# Patient Record
Sex: FEMALE | Race: WHITE | Employment: OTHER | URBAN - METROPOLITAN AREA
[De-identification: names, ages, dates, MRNs, and addresses within clinical notes are randomized per-mention and may not be internally consistent; named-entity substitution may affect disease eponyms.]

---

## 2017-03-27 ENCOUNTER — HOSPITAL ENCOUNTER (OUTPATIENT)
Dept: GENERAL RADIOLOGY | Age: 72
Discharge: HOME | End: 2017-03-27
Attending: NURSE PRACTITIONER
Payer: MEDICARE

## 2017-03-27 DIAGNOSIS — M79.671 PAIN OF RIGHT HEEL: ICD-10-CM

## 2017-03-27 PROCEDURE — 73630 X-RAY EXAM OF FOOT: CPT

## 2017-06-28 PROBLEM — I10 ESSENTIAL HYPERTENSION WITH GOAL BLOOD PRESSURE LESS THAN 130/85: Status: ACTIVE | Noted: 2017-06-28

## 2017-06-28 PROBLEM — E78.2 MIXED HYPERLIPIDEMIA: Status: ACTIVE | Noted: 2017-06-28

## 2017-06-28 PROBLEM — Z95.1 S/P CABG X 4: Status: ACTIVE | Noted: 2017-06-28

## 2019-11-21 ENCOUNTER — HOSPITAL ENCOUNTER (OUTPATIENT)
Dept: PHYSICAL THERAPY | Age: 74
Discharge: HOME | End: 2019-11-21
Attending: FAMILY MEDICINE
Payer: MEDICARE

## 2019-11-21 DIAGNOSIS — S46.011A ROTATOR CUFF STRAIN, RIGHT, INITIAL ENCOUNTER: ICD-10-CM

## 2019-11-21 PROCEDURE — 97161 PT EVAL LOW COMPLEX 20 MIN: CPT | Performed by: PHYSICAL THERAPIST

## 2019-11-21 PROCEDURE — 97110 THERAPEUTIC EXERCISES: CPT | Performed by: PHYSICAL THERAPIST

## 2019-11-21 NOTE — THERAPY EVALUATION
Amandabaljinder Reasons  : 1945  Primary: Alayna Albert Medicare Choice *  Secondary:  [address]  at [facility]  [address], [address], [place]  [phone]   [phone]       OUTPATIENT PHYSICAL THERAPY:Initial Assessment 2019   ICD-10: Treatment Diagnosis: (M25.511) R shoulder pain; (M62.81) muscle weakness  Precautions/Allergies:   Patient has no known allergies. TREATMENT PLAN:  Effective Dates: 2019 TO 2020 (90 days). Frequency/Duration: 1 time a week for 90 Day(s)     MEDICAL/REFERRING DIAGNOSIS:  Rotator cuff strain, right, initial encounter [S46.011A]   DATE OF ONSET: May 2019  REFERRING PHYSICIAN: Precious Nguyen MD MD Orders: Evaluate and Treat for R RC strain  Return MD Appointment: May 2020     INITIAL ASSESSMENT:  Ms. Nikki Del Castillo presents with s/s consistent with above diagnosis. She presents with decreased R shoulder AROM and strength and an increase in R anterior shoulder pain. Pt scored a 30% disability rating on the quick dash. Pt will benefit from skilled PT to address the deficits listed below. PROBLEM LIST (Impacting functional limitations):  1. Decreased Strength  2. Decreased ADL/Functional Activities  3. Increased Pain  4. Decreased Flexibility/Joint Mobility  5. Decreased Knowledge of Precautions  6. Decreased [place] with Home Exercise Program INTERVENTIONS PLANNED: (Treatment may consist of any combination of the following)  1. Cold  2. Electrical Stimulation  3. Heat  4. Home Exercise Program (HEP)  5. Manual Therapy  6. Neuromuscular Re-education/Strengthening  7. Range of Motion (ROM)  8. Therapeutic Exercise/Strengthening  9. Transcutaneous Electrical Nerve Stimulation (TENS)  10. Ultrasound (US)  11. Kinesiotaping   TREATMENT PLAN:  GOALS: (Goals have been discussed and agreed upon with patient.)  Short-Term Functional Goals: Time Frame: 4-6 weeks (2020)  1.    Pt will be compliant and independent with HEP.  2.   Pt will improve Quick Dash score to <25% disability to increase pt's overall functional mobility. 3.   Pt to subjectively report a decrease in pain to 3/10 @ worst to increase pt's ease with ADLs. 4.   Pt will improve standing AROM of R shoulder to >Flex/Scapt: 150deg; ABD:140deg; ER in N:55deg; ER in ABD:75deg; and IR:T9 to improve pt's ability to reach behind back, overhead and perform ADLs such as dressing without modifications. 5.   Pt will be able to sleep with decreased frequency (1x/night) of waking due to R shoulder pain. 6.   Pt will increase MMT to 3+/5 throughout R shoulder and 4+/5 for elbow in order to perform house hold and work related tasks with greater ease. Discharge Goals: Time Frame: 8-12 weeks (2-)  1. Pt will improve Quick Dash score to <20 % disability to increase pt's overall functional mobility. 2.   Pt will subjectively report a decrease in pain to 2/10 @ worst to allow pt greater ease with reaching overhead and dressing. 3.   Pt will no longer wake due to R shoulder pain. 4.   Pt will demonstrate AROM of R  shoulder that is within 10deg of the L and does not limit function. 5.   Pt will increase MMT to 4/5 throughout R shoulder to allow pt to return to PLOF with min to no c/o R shoulder pain. 6.   Pt will be discharged from PT to HEP. OUTCOME MEASURE:   Tool Used: Disabilities of the Arm, Shoulder and Hand (DASH) Questionnaire - Quick Version  Score:  Initial: 24/55=30% disability  Most Recent: X/55 (Date: -- )   Interpretation of Score: The DASH is designed to measure the activities of daily living in person's with upper extremity dysfunction or pain. Each section is scored on a 1-5 scale, 5 representing the greatest disability. The scores of each section are added together for a total score of 55.         MEDICAL NECESSITY:   · Patient is expected to demonstrate progress in strength and range of motion to decrease R shoulder pain and increase pt's ease with ADLs and caregiver repsponsibilities. .  Total Duration: 15 minutes initial assessment; see daily treatment note  PT Patient Time In/Time Out  Time In: 0935  Time Out: 1015    Rehabilitation Potential For Stated Goals: Good  Regarding Chuy Lovell therapy, I certify that the treatment plan above will be carried out by a therapist or under their direction. Thank you for this referral,  Lorne Calvert, PT     Referring Physician Signature: Denita Balderrama MD _______________________________ Date _____________     PAIN/SUBJECTIVE:   Initial: Pain Intensity 1: 5  Pain Location 1: Shoulder  Pain Orientation 1: Right  Post Session:  5/10   HISTORY:   History of Injury/Illness (Reason for Referral):  Pt reports she injured her R shoulder while caring for her disabled  in May 2019. She denies a fall or MVA. She reports it's wear and tear. She takes Aleve prn to manage pain and heat to sleep when needed. Past Medical History/Comorbidities:   Ms. Kalpesh Flores  has a past medical history of CAD (coronary artery disease), History of tetanus, diphtheria, and acellular pertussis booster vaccination (Tdap) (2012), Hypertension, and S/P colonoscopy (2013). Ms. Kalpesh Flores  has a past surgical history that includes pr cardiac surg procedure unlist; hx gyn; and hx colonoscopy (2013). Social History/Living Environment:     Pt lives in a 1 story home with her . She is his caregiver. Prior Level of Function/Work/Activity:  Pt is retired, but is very active at home and outside the house. Dominant Side:         RIGHT   Ambulatory/Rehab Services H2 Model Falls Risk Assessment (11/21/19)   Risk Factors:       No Risk Factors Identified Ability to Rise from Chair:       (0)  Ability to rise in a single movement   Falls Prevention Plan:       No modifications necessary   Total: (5 or greater = High Risk): 0   ©2010 [facility] of [employer]. [facility] States Patent #[id number].  Federal Law prohibits the replication, distribution or use without written permission from [facility] [employer] [facility]   Current Medications:       Current Outpatient Medications:     tamoxifen (NOLVADEX) 20 mg tablet, Take 1 Tab by mouth daily. , Disp: 90 Tab, Rfl: 3    atorvastatin (LIPITOR) 80 mg tablet, TAKE 1 TABLET ONE TIME DAILY, Disp: 90 Tab, Rfl: 3    lisinopril (PRINIVIL, ZESTRIL) 20 mg tablet, Take 1 Tab by mouth daily. , Disp: 90 Tab, Rfl: 3    traZODone (DESYREL) 50 mg tablet, TAKE 1 TABLET AT BEDTIME, Disp: 90 Tab, Rfl: 3    venlafaxine-SR (EFFEXOR-XR) 37.5 mg capsule, Take once per day, Disp: 90 Cap, Rfl: 3    MULTIVITAMIN PO, Take  by mouth daily. , Disp: , Rfl:    Date Last Reviewed:  11/21/2019     Number of Personal Factors/Comorbidities that affect the Plan of Care: 0: LOW COMPLEXITY   EXAMINATION:   Observation/Orthostatic Postural Assessment:          Pt sits with moderately forward head and rounded shoulders which is feeding into her anterior R shoulder pain. Palpation:          Pt moderately tender with palpation to R bicipital groove and R long head of biceps.     Standing AROM/Supine PROM:    Shoulder ROM  DATE  11/21/19 DATE     Flexion R: 140deg; 3/5  L:  R:   L:    Scaption R:145deg; 3/5  L: R:  L:   ABD R: 130deg; 3/5  L:  R:   L:    ER (N / 90 ABD) R: 50deg/70deg; 3/5  L:  R:   L:    IR R: T10; 3/5  L:  R:   L:    Flexibility PC R: WFL  L:  R:   L:      Strength Date:  11/21/19 Date:   Date:     Shoulder Parameters Parameters Parameters   Scapular Control (Ecc lowering) R:Yes; min pain  L:     Shoulder Flex/Scapt R:3/5  L:     Shoulder ABD R:3/5  L:     Shoulder ER R:3/5  L:     Shoulder IR R:3/5  L:     Elbow Flex R:4/5  L:     Elbow Ext R:4/5  L:         Myotomes: Normal and equal B throughout  Diaphragm (C4):  Deltoid/Biceps (C5):  Wrist Extensors (C6):  Triceps (C7):  Flexor Profundus (C8):    Sensation: Normal and equal B throughout  Biceps (C5):  Palmar Radial (C6-C7):  Palmar Ulnar (C8-T1):    Special Test/Function:  Cervical Clearing: WFL throughout  Spurling's maneuver: negative  Impingement Test: +R  Empty Can Test: +R  Speeds Test: -  Drop Arm Test:-   Body Structures Involved:  1. Nerves  2. Bones  3. Joints  4. Muscles  5. Ligaments Body Functions Affected:  1. Sensory/Pain  2. Neuromusculoskeletal  3. Movement Related Activities and Participation Affected:  1. Mobility  2. Self Care  3. Domestic Life  4. Interpersonal Interactions and Relationships  5.  Community, Social and [place]   Number of elements (examined above) that affect the Plan of Care: 1-2: LOW COMPLEXITY   CLINICAL PRESENTATION:   Presentation: Stable and uncomplicated: LOW COMPLEXITY   CLINICAL DECISION MAKING:   Use of outcome tool(s) and clinical judgement create a POC that gives a: Clear prediction of patient's progress: LOW COMPLEXITY

## 2019-11-21 NOTE — PROGRESS NOTES
Enedina Ramirez  : 1945  Primary: Bill Dumont Humanstefany Medicare Choice *  Secondary:  [address] at [facility]  [address], [address], [place] [address]  [phone]   [phone]      OUTPATIENT PHYSICAL THERAPY: Daily Treatment Note 2019  ICD-10: Treatment Diagnosis: (M25.511) R shoulder pain; (M62.81) muscle weakness  Precautions/Allergies:   Patient has no known allergies. TREATMENT PLAN:  Effective Dates: 2019 TO 2020 (90 days). Frequency/Duration: 1 time a week for 90 Day(s)     MEDICAL/REFERRING DIAGNOSIS:  Rotator cuff strain, right, initial encounter [S46.011A]   DATE OF ONSET: May 2019  REFERRING PHYSICIAN: Filemon Phillips MD MD Orders: Evaluate and Treat for Bambi Swain strain  Return MD Appointment: 2020     Pre-treatment Symptoms/Complaints:  Pt c/o anterior R shoulder pain and weakness with certain activities. Pain: Initial: Pain Intensity 1: 5  Pain Location 1: Shoulder  Pain Orientation 1: Right  Post Session:  5/10   Medications Last Reviewed:  2019    Updated Objective Findings:  See evaluation note from today   TREATMENT:   THERAPEUTIC EXERCISE: (25 minutes):  Exercises per grid below to improve mobility and strength. Required minimal visual and verbal cues to promote proper body alignment, promote proper body posture, promote proper body mechanics and promote proper body breathing techniques. Progressed resistance, range, repetitions and complexity of movement as indicated. MODALITIES: (0 minutes): *  Ultrasound was used today secondary to the patient having tightened structures limiting joint motion that require an increase in extensibility. Ultrasound was used today to reduce pain, increase muscle flexibility, increase tendon flexibility and increase ligament flexibility.     Date:  19 Date:   Date:     Activity/Exercise Parameters Parameters Parameters   Shoulder Rolls 10x     Shoulder Shrugs 10x     Scapular Retraction 10x     B Shoulder ER 10x                           Treatment/Session Summary:    · Response to Treatment:  Pt did well with basic above postural exercises with no increase in shoulder pain, therefore I added those to her HEP. Alexis Waters · Communication/Consultation:  Posture; avoiding lifting with R elbow extension; NSAIDS; topical Volaren  · Equipment provided today:  HEP  · Recommendations/Intent for next treatment session: Next visit will focus on advancements to more challenging RC strengthening and postural exercises/stretches.   · Variance to POC: None    Total Treatment Billable Duration:  25 min therex; see initial assessment  PT Patient Time In/Time Out  Time In: 0957  Time Out: 7090 [facility],     Future Appointments   Date Time Provider Rei Ganti   12/3/2019  2:30 PM Jana Maradiaga, PT [facility]   12/17/2019  1:45 PM Jana Maradiaga PT [facility]   1/7/2020  1:45 [facility]   1/15/2020  2:30 PM SFE DEXA BI GE LUNAR DEXA SFERMAM SFE   5/12/2020 10:00 AM Otoniel Mccullough MD [facility]

## 2019-12-03 ENCOUNTER — HOSPITAL ENCOUNTER (OUTPATIENT)
Dept: PHYSICAL THERAPY | Age: 74
Discharge: HOME | End: 2019-12-03
Attending: FAMILY MEDICINE
Payer: MEDICARE

## 2019-12-03 PROCEDURE — 97110 THERAPEUTIC EXERCISES: CPT | Performed by: PHYSICAL THERAPIST

## 2019-12-03 PROCEDURE — 97035 APP MDLTY 1+ULTRASOUND EA 15: CPT | Performed by: PHYSICAL THERAPIST

## 2019-12-03 NOTE — PROGRESS NOTES
Maria E Wilks  : 1945  Primary: Teo Albert Medicare Choice *  Secondary:  [facility]  [facility] [address], [place] [address]  [phone]   [phone]      OUTPATIENT PHYSICAL THERAPY: Daily Treatment Note 12/3/2019  ICD-10: Treatment Diagnosis: (M25.511) R shoulder pain; (M62.81) muscle weakness  Precautions/Allergies:   Patient has no known allergies. TREATMENT PLAN:  Effective Dates: 2019 TO 2020 (90 days). Frequency/Duration: 1 time a week for 90 Day(s)     MEDICAL/REFERRING DIAGNOSIS:  Rotator cuff strain, right, initial encounter [S46.011A]   DATE OF ONSET: May 2019  REFERRING PHYSICIAN: Tangela Vo MD MD Orders: Evaluate and Treat for Chris Lozano strain  Return MD Appointment: 2020     Pre-treatment Symptoms/Complaints:  Pt c/o intermittent anterior/lateral R shoulder/arm pain. Pt reports compliance with HEP. Pain: Initial: Pain Intensity 1: 4  Pain Location 1: Shoulder  Pain Orientation 1: Right  Post Session:  4/10 soreness/fatigue after exercises   Medications Last Reviewed:  12/3/2019    Updated Objective Findings:  None Today   TREATMENT:   THERAPEUTIC EXERCISE: (35 minutes):  Exercises per grid below to improve mobility and strength. Required minimal visual and verbal cues to promote proper body alignment, promote proper body posture, promote proper body mechanics and promote proper body breathing techniques. Progressed resistance, range, repetitions and complexity of movement as indicated. Pt kinesiotaped to unload R shoulder/AC joint and long head of biceps to aide with decreasing pain and inflammation.       Date:  19 Date:  12/3/19 Date:     Activity/Exercise Parameters Parameters Parameters   Shoulder Rolls 10x 20x    Shoulder Shrugs 10x 20x    Scapular Retraction 10x     B Shoulder ER 10x RTB, 20x                R IR  RTB, 20x    R ER  RTB, 20x    B scapular retraction  RTB, 20x    B shoulder extension RTB, 20x    UBE  Res1, 8min      MODALITIES: (10 minutes): Ultrasound was used today secondary to the patient having tightened structures limiting joint motion that require an increase in extensibility. Ultrasound was used today to reduce pain, increase muscle flexibility, increase tendon flexibility and increase ligament flexibility. Treatment/Session Summary:    · Response to Treatment:  Pt fatigued quickly with above postural and R RC tband exercises. She was able to perform without pain, however, soreness appeared  during US and taping. Pt works hard with HEP. Ana María Castro · Communication/Consultation:  Posture; avoiding lifting with R elbow extension; NSAIDS; topical Volaren  · Equipment provided today:  HEP/RTB  · Recommendations/Intent for next treatment session: Next visit will focus on advancements to more challenging RC strengthening and postural exercises/stretches.   · Variance to POC: None    Total Treatment Billable Duration:  45 minutes (35 min therex; 10 min US)  PT Patient Time In/Time Out  Time In: 1430  Time Out: 1700 [facility], PT    Future Appointments   Date Time Provider Rei Badillo   12/17/2019  1:45 PM [facility]   1/7/2020  1:45 PM Arun Luong [facility]   1/15/2020  2:30 PM SFE DEXA BI GE LUNAR DEXA SFERMAM SFE   5/12/2020 10:00 AM [facility]

## 2019-12-17 ENCOUNTER — HOSPITAL ENCOUNTER (OUTPATIENT)
Dept: PHYSICAL THERAPY | Age: 74
End: 2019-12-17
Attending: FAMILY MEDICINE
Payer: MEDICARE

## 2019-12-30 NOTE — THERAPY DISCHARGE
Mary Alba  : 1945  Primary: Gisella Albert Medicare Choice *  Secondary:  [address]  at [facility]  [address], [address], [place] [address]  [phone]   [phone]       OUTPATIENT PHYSICAL THERAPY:Discontinuation Summary 12/3/2019   ICD-10: Treatment Diagnosis: (M25.511) R shoulder pain; (M62.81) muscle weakness  Precautions/Allergies:   Patient has no known allergies. TREATMENT PLAN:  Effective Dates: 2019 TO 2020 (90 days). Frequency/Duration: 1 time a week for 90 Day(s)     MEDICAL/REFERRING DIAGNOSIS:  Rotator cuff strain, right, initial encounter [W35.885O]   DATE OF ONSET: May 2019  REFERRING PHYSICIAN: Ayala Camejo MD MD Orders: Evaluate and Treat for R RC strain  Return MD Appointment: May 2020     Mary Alba has been seen in physical therapy from 19 to 12/3/19 for 2 visits. Treatment has been discontinued at this time due to patient calling and reporting she's doing much better and requesting to be DC'd. and patient failing to return for additional treatment. The below goals were met prior to discontinuation. Some goals were not met due to patient requesting DC after 2 visits. Pt is compliant and independent with her HEP. Pt is DC'd from PT to HEP at this time. Thank you for this referral.        PROBLEM LIST (Impacting functional limitations):  1. Decreased Strength  2. Decreased ADL/Functional Activities  3. Increased Pain  4. Decreased Flexibility/Joint Mobility  5. Decreased Knowledge of Precautions  6. Decreased [place] with Home Exercise Program INTERVENTIONS PLANNED: (Treatment may consist of any combination of the following)  1. Cold  2. Electrical Stimulation  3. Heat  4. Home Exercise Program (HEP)  5. Manual Therapy  6. Neuromuscular Re-education/Strengthening  7. Range of Motion (ROM)  8. Therapeutic Exercise/Strengthening  9. Transcutaneous Electrical Nerve Stimulation (TENS)  10.  Ultrasound (US)  11. Kinesiotaping   TREATMENT PLAN:  GOALS: (Goals have been discussed and agreed upon with patient.)  Short-Term Functional Goals: Time Frame: 4-6 weeks (1-2-2020)  1. Pt will be compliant and independent with HEP.-------------------------------------------------MET  2. Pt will improve Quick Dash score to <25% disability to increase pt's overall functional mobility.----NOT ASSESSED  3. Pt to subjectively report a decrease in pain to 3/10 @ worst to increase pt's ease with ADLs.-------NOT ASSESSED  4. Pt will improve standing AROM of R shoulder to >Flex/Scapt: 150deg; ABD:140deg; ER in N:55deg; ER in ABD:75deg; and IR:T9 to improve pt's ability to reach behind back, overhead and perform ADLs such as dressing without modifications.--------------------------------------------------NOT ASSESSED  5. Pt will be able to sleep with decreased frequency (1x/night) of waking due to R shoulder pain.---------------------------NOT ASSESSED  6. Pt will increase MMT to 3+/5 throughout R shoulder and 4+/5 for elbow in order to perform house hold and work related tasks with greater ease.----NOT ASSESSED    Discharge Goals: Time Frame: 8-12 weeks (2-)  1. Pt will improve Quick Dash score to <20 % disability to increase pt's overall functional mobility.---------------------------------------NOT ASSESSED  2. Pt will subjectively report a decrease in pain to 2/10 @ worst to allow pt greater ease with reaching overhead and dressing. ------------NOT ASSESSED  3. Pt will no longer wake due to R shoulder pain.---------------------------------------------------------------NOT ASSESSED  4. Pt will demonstrate AROM of R  shoulder that is within 10deg of the L and does not limit function. -------------------------------------------NOT ASSESSED  5. Pt will increase MMT to 4/5 throughout R shoulder to allow pt to return to PLOF with min to no c/o R shoulder pain.------------------------NOT ASSESSED  6.    Pt will be discharged from PT to HEP.-----------------------------------------------------MET Amelie Bettencourt, PT

## 2020-01-07 ENCOUNTER — APPOINTMENT (OUTPATIENT)
Dept: PHYSICAL THERAPY | Age: 75
End: 2020-01-07
Attending: FAMILY MEDICINE

## 2020-01-15 ENCOUNTER — HOSPITAL ENCOUNTER (OUTPATIENT)
Dept: MAMMOGRAPHY | Age: 75
Discharge: HOME | End: 2020-01-15
Attending: FAMILY MEDICINE
Payer: MEDICARE

## 2020-01-15 DIAGNOSIS — Z78.0 MENOPAUSE: ICD-10-CM

## 2020-01-15 PROCEDURE — 77080 DXA BONE DENSITY AXIAL: CPT

## 2021-03-23 PROBLEM — M19.042 ARTHRITIS OF FINGER OF LEFT HAND: Status: ACTIVE | Noted: 2021-03-23

## 2021-03-23 PROBLEM — M19.041 ARTHRITIS OF FINGER OF RIGHT HAND: Status: ACTIVE | Noted: 2021-03-23

## 2021-12-08 PROBLEM — M18.12 ARTHRITIS OF CARPOMETACARPAL (CMC) JOINT OF LEFT THUMB: Status: ACTIVE | Noted: 2021-12-08

## 2022-03-19 PROBLEM — M18.12 ARTHRITIS OF CARPOMETACARPAL (CMC) JOINT OF LEFT THUMB: Status: ACTIVE | Noted: 2021-12-08

## 2022-03-19 PROBLEM — E78.2 MIXED HYPERLIPIDEMIA: Status: ACTIVE | Noted: 2017-06-28

## 2022-03-20 PROBLEM — Z95.1 S/P CABG X 4: Status: ACTIVE | Noted: 2017-06-28

## 2022-03-20 PROBLEM — M19.041 ARTHRITIS OF FINGER OF RIGHT HAND: Status: ACTIVE | Noted: 2021-03-23

## 2022-03-20 PROBLEM — I10 ESSENTIAL HYPERTENSION WITH GOAL BLOOD PRESSURE LESS THAN 130/85: Status: ACTIVE | Noted: 2017-06-28

## 2022-03-20 PROBLEM — M19.042 ARTHRITIS OF FINGER OF LEFT HAND: Status: ACTIVE | Noted: 2021-03-23

## 2022-03-22 PROBLEM — M65.341 TRIGGER FINGER, RIGHT RING FINGER: Status: ACTIVE | Noted: 2022-03-22

## 2022-03-24 PROBLEM — M65.341 TRIGGER FINGER, RIGHT RING FINGER: Status: ACTIVE | Noted: 2022-03-22

## 2022-06-28 ENCOUNTER — OFFICE VISIT (OUTPATIENT)
Dept: ORTHOPEDIC SURGERY | Age: 77
End: 2022-06-28
Payer: COMMERCIAL

## 2022-06-28 DIAGNOSIS — M65.341 TRIGGER FINGER, RIGHT RING FINGER: Primary | ICD-10-CM

## 2022-06-28 DIAGNOSIS — M19.042 DEGENERATIVE ARTHRITIS OF METACARPOPHALANGEAL JOINT OF LEFT THUMB: ICD-10-CM

## 2022-06-28 PROCEDURE — 1123F ACP DISCUSS/DSCN MKR DOCD: CPT | Performed by: ORTHOPAEDIC SURGERY

## 2022-06-28 PROCEDURE — 99214 OFFICE O/P EST MOD 30 MIN: CPT | Performed by: ORTHOPAEDIC SURGERY

## 2022-06-28 PROCEDURE — 20550 NJX 1 TENDON SHEATH/LIGAMENT: CPT | Performed by: ORTHOPAEDIC SURGERY

## 2022-06-28 PROCEDURE — 20600 DRAIN/INJ JOINT/BURSA W/O US: CPT | Performed by: ORTHOPAEDIC SURGERY

## 2022-06-28 RX ORDER — BETAMETHASONE SODIUM PHOSPHATE AND BETAMETHASONE ACETATE 3; 3 MG/ML; MG/ML
6 INJECTION, SUSPENSION INTRA-ARTICULAR; INTRALESIONAL; INTRAMUSCULAR; SOFT TISSUE ONCE
Status: COMPLETED | OUTPATIENT
Start: 2022-06-28 | End: 2022-06-28

## 2022-06-28 RX ADMIN — BETAMETHASONE SODIUM PHOSPHATE AND BETAMETHASONE ACETATE 6 MG: 3; 3 INJECTION, SUSPENSION INTRA-ARTICULAR; INTRALESIONAL; INTRAMUSCULAR; SOFT TISSUE at 09:15

## 2022-06-28 NOTE — PROGRESS NOTES
Orthopaedic Hand Clinic Note      Name: Ramila Latif   Age: 78 y.o. YOB: 1945   Gender: female   MRN: [id number]         Follow up visit:       HPI: Ramila Latif  is a 78 y.o. female who is following  up for bilateral hand and thumb pain, she is well-established with me for bilateral thumb MCP joint arthritis, she has received multiple steroid injections for bilateral thumb MCPs as well as for the right ring trigger finger. ROS/Meds/PSH/PMH/FH/SH: I personally reviewed the patients standard intake form. Pertinents are discussed in the HPI      Physical Examination:   General: Awake and alert. HEENT: Normocephalic, atraumatic   CV/Pulm: Breathing even and unlabored   Skin: No obvious rashes noted. Lymphatic: No obvious evidence of lymphedema or lymphadenopathy      Musculoskeletal Examination:   Examination on the Left upper extremity demonstrates cap refill < 5 seconds in all fingers, moderate tenderness palpation of the left thumb MP joint and CMC joint, examination of the right upper extremity  demonstrates no tenderness at the right thumb CMC joint, moderate tenderness palpation of the right thumb MP joint as well as the right thumb IP joint, she holds the thumb in slight IP joint flexion however there is no tenderness at the right thumb A1  pulley or clicking. There is tenderness ovation of the right ring finger A1 pulley with palpable clicking and locking, this is a new finding from the previous examination      Imaging / Electrodiagnostic Tests:       Previous x-rays were again reviewed which demonstrate bone-on-bone arthritis of the left thumb CMC joint, advanced arthritis of bilateral thumb MP joints      Assessment:      1. Trigger finger, right ring finger      2. Arthritis of finger of left hand      3. Arthritis of finger of right hand         4.   Arthritis of carpometacarpal (CMC) joint of left thumb            Plan:    We discussed the diagnosis and different treatment options. We discussed observation, therapy, antiinflammatory medications and other pertinent treatment modalities. After discussing in detail the patient elects to proceed with right ring finger A1 pulley steroid injection, left thumb MCP joint steroid injection, we again discussed all treatment options including surgical release of the right ring A1 pulley as well as left thumb MCP joint fusion which the patient will consider. Patient voiced accordance and understanding of the information provided and the formulated plan. All questions were answered to the patient's satisfaction during the encounter. Procedure Note      The risk, benefits and alternatives of injection and no injection therapy were discussed. The patient consented for an injection. The patient has been identified by name and birthdate. The injection site was identified, marked and prepped with a alcohol  swab. Time out completed. The A1 pulley of the Right ring finger was/were injected with a 25 gauge needle with 1ml of 6mg/ml Betamethasone and  1ml xylocaine plain 1%. The injection site was then dressed with a bandaid. The patient tolerated the injection well. The patient was instructed to monitor their blood sugars if diabetic and call if any concerns. The patient was instructed to call the  office if any adverse local effects occurred or any if any questions or concerns arise. Procedure Note    The risk, benefits and alternatives of injection and no injection therapy were discussed. The patient consented for an injection. The patient has been identified by name and birthdate. The injection site was identified, marked and prepped with a alcohol swab. Time out completed. The Left thumb MCP joint  was injected with 1ml of 6mg/ml Betamethasone and 1ml xylocaine plain 1%. The injection site was then dressed with a bandaid. The patient tolerated the injection well.  The patient was instructed to monitor their blood sugars if diabetic and call if any concerns. The patient was instructed to call the office if any adverse local effects occurred or any if any questions or concerns arise.         Maria Luisa Byrnes MD   Orthopaedic Surgery   03/22/22   12:27 PM

## 2022-09-28 ENCOUNTER — OFFICE VISIT (OUTPATIENT)
Dept: ORTHOPEDIC SURGERY | Age: 77
End: 2022-09-28
Payer: COMMERCIAL

## 2022-09-28 DIAGNOSIS — M65.341 TRIGGER FINGER, RIGHT RING FINGER: Primary | ICD-10-CM

## 2022-09-28 DIAGNOSIS — M19.041 DEGENERATIVE ARTHRITIS OF METACARPOPHALANGEAL JOINT OF RIGHT THUMB: ICD-10-CM

## 2022-09-28 DIAGNOSIS — M19.042 DEGENERATIVE ARTHRITIS OF METACARPOPHALANGEAL JOINT OF LEFT THUMB: ICD-10-CM

## 2022-09-28 PROCEDURE — 20600 DRAIN/INJ JOINT/BURSA W/O US: CPT | Performed by: ORTHOPAEDIC SURGERY

## 2022-09-28 PROCEDURE — 20550 NJX 1 TENDON SHEATH/LIGAMENT: CPT | Performed by: ORTHOPAEDIC SURGERY

## 2022-09-28 RX ORDER — BETAMETHASONE SODIUM PHOSPHATE AND BETAMETHASONE ACETATE 3; 3 MG/ML; MG/ML
6 INJECTION, SUSPENSION INTRA-ARTICULAR; INTRALESIONAL; INTRAMUSCULAR; SOFT TISSUE ONCE
Status: COMPLETED | OUTPATIENT
Start: 2022-09-28 | End: 2022-09-28

## 2022-09-28 RX ADMIN — BETAMETHASONE SODIUM PHOSPHATE AND BETAMETHASONE ACETATE 6 MG: 3; 3 INJECTION, SUSPENSION INTRA-ARTICULAR; INTRALESIONAL; INTRAMUSCULAR; SOFT TISSUE at 15:20

## 2022-09-28 RX ADMIN — BETAMETHASONE SODIUM PHOSPHATE AND BETAMETHASONE ACETATE 6 MG: 3; 3 INJECTION, SUSPENSION INTRA-ARTICULAR; INTRALESIONAL; INTRAMUSCULAR; SOFT TISSUE at 15:21

## 2022-09-28 NOTE — PROGRESS NOTES
Orthopaedic Hand Clinic Note    Name: Analia Gudino  Age: 78 y.o. YOB: 1945  Gender: female  MRN: [id number]      Follow up visit:   1. Trigger finger, right ring finger    2. 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 Degenerative arthritis of metacarpophalangeal joint of left thumb        HPI: Analia Gudino is a 78 y.o. female who is following up for bilateral thumb MCP joint arthritis, right ring trigger finger, I have performed multiple steroid injections, the symptoms recurred after a few months. ROS/Meds/PSH/PMH/FH/SH: I personally reviewed the patients standard intake form. Pertinents are discussed in the HPI    Physical Examination:  General: Awake and alert. HEENT: Normocephalic, atraumatic  CV/Pulm: Breathing even and unlabored  Skin: No obvious rashes noted. Lymphatic: No obvious evidence of lymphedema or lymphadenopathy    Musculoskeletal Examination:  Examination on the bilateral upper extremity demonstrates cap refill < 5 seconds in all fingers, severe tenderness palpation of bilateral thumb MCP joints, no tender palpation of bilateral thumb CMC joints, tenderness palpation of the right ring finger A1 pulley with palpable clicking but no locking today. Imaging / Electrodiagnostic Tests:     X-rays were again reviewed which demonstrate bilateral thumb CMC joint arthritis and bilateral thumb MCP joint arthritis    Assessment:   1. Trigger finger, right ring finger    2. 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 Degenerative arthritis of metacarpophalangeal joint of left thumb        Plan:   We discussed the diagnosis and different treatment options. We discussed observation, therapy, antiinflammatory medications and other pertinent treatment modalities.     After discussing in detail the patient elects to proceed with bilateral thumb MCP joint steroid injection, right ring A1 pulley steroid injection, we discussed all treatment options in detail again, I explained that she has had multiple steroid injections for the trigger finger and this is unlikely to resolve the issue, she also understands that outcomes after surgery in patients that have had received multiple steroid injections are not as ideal, she also understands that she has Dupuytren's disease affecting the right thumb with very subtle cords along the palm without contracture and this may be aggravated after trigger finger release surgery. We also discussed bilateral thumb MCP joint arthrodesis, this is a surgery that is very reliable for pain relief and she will consider this as well. Procedure Note    The risk, benefits and alternatives of injection and no injection therapy were discussed. The patient consented for an injection. The patient has been identified by name and birthdate. The injection site was identified, marked and prepped with a alcohol swab. Time out completed. The A1 pulley of the Right ring finger was/were injected with a 25 gauge needle with 1ml of 6mg/ml Betamethasone and 1ml xylocaine plain 1%. The injection site was then dressed with a bandaid. The patient tolerated the injection well. The patient was instructed to monitor their blood sugars if diabetic and call if any concerns. The patient was instructed to call the office if any adverse local effects occurred or any if any questions or concerns arise. Procedure Note    The risk, benefits and alternatives of injection and no injection therapy were discussed. The patient consented for an injection. The patient has been identified by name and birthdate. The injection site was identified, marked and prepped with a alcohol swab. Time out completed. The Bilateral thumb MCP joints  was injected with 1ml of 6mg/ml Betamethasone and 1ml xylocaine plain 1%. The injection site was then dressed with a bandaid. The patient tolerated the injection well.  The patient was instructed to monitor their blood sugars if diabetic and call if any concerns. The patient was instructed to call the office if any adverse local effects occurred or any if any questions or concerns arise. Patient voiced accordance and understanding of the information provided and the formulated plan. All questions were answered to the patient's satisfaction during the encounter.     Caroline Drummond MD  Orthopaedic Surgery  09/28/22  3:41 PM

## 2022-10-14 RX ORDER — EZETIMIBE 10 MG/1
TABLET ORAL
Qty: 90 TABLET | Refills: 0 | Status: SHIPPED | OUTPATIENT
Start: 2022-10-14

## 2022-10-14 NOTE — TELEPHONE ENCOUNTER
Requested Prescriptions     Pending Prescriptions Disp Refills    ezetimibe (ZETIA) 10 MG tablet [Pharmacy Med Name: EZETIMIBE 10 MG Tablet] 90 tablet      Sig: TAKE 1 TABLET EVERY DAY    Send to Mrs Nani Rubinstein to sign.

## 2022-11-10 NOTE — PROGRESS NOTES
[facility] CARDIOLOGY Follow Up                 Reason for Visit: Stable ischemic heart disease    Subjective:     Patient is a 78 y.o. female with a PMH of hypertension, hyperlipidemia, CAD status post CABG, and breast cancer who presents for follow-up. The patient was last seen by Dr. Mi Brady in December 2021. She had an DARYN in June 2021 that was noted to demonstrate no myocardial ischemia. The patient denies angina and dyspnea. Past Medical History:   Diagnosis Date    CAD (coronary artery disease)     hyperlipidemia    History of tetanus, diphtheria, and acellular pertussis booster vaccination (Tdap) 2012    due 2022    Hypertension     S/P colonoscopy 2013    due in 2023      Past Surgical History:   Procedure Laterality Date    COLONOSCOPY  2013    due in 2023    GYN      hysterectomy    [facility] LOC BREAST BIOPSY RIGHT Right 3/23/2018    [facility]INE STEROTACTIC LOC BREAST BIOPSY RIGHT [facility] CC AMB HISTORICAL    [place] CARDIAC SURG PROCEDURE UNLIST      01/2011      No family history on file. Social History     Tobacco Use    Smoking status: Former    Smokeless tobacco: Never   Substance Use Topics    Alcohol use: Yes     Alcohol/week: 5.8 standard drinks      No Known Allergies      ROS:  No obvious pertinent positives on review of systems except for what was outlined above.        Objective:       /64   Pulse 76   Ht 5' 3\" (1.6 m)   Wt 120 lb 1.6 oz (54.5 kg)   BMI 21.27 kg/m²     BP Readings from Last 3 Encounters:   11/11/22 138/64   12/23/21 (!) 126/52   06/09/21 (!) 128/52       Wt Readings from Last 3 Encounters:   11/11/22 120 lb 1.6 oz (54.5 kg)   12/23/21 112 lb 3.2 oz (50.9 kg)   08/05/21 107 lb (48.5 kg)       General/Constitutional:   Alert and oriented x 3, no acute distress  HEENT:   normocephalic, atraumatic, moist mucous membranes  Neck:   No JVD or carotid bruits bilaterally  Cardiovascular:   regular rate and rhythm, no rub/gallop appreciated  Pulmonary:   clear to auscultation bilaterally, no respiratory distress  Abdomen:   soft, non-tender, non-distended  Ext:   No sig LE edema bilaterally  Skin:  warm and dry, no obvious rashes seen  Neuro:   no obvious sensory or motor deficits  Psychiatric:   normal mood and affect    Data Review:   Lab Results   Component Value Date    CHOL 155 06/12/2020    CHOL 175 11/12/2019     Lab Results   Component Value Date    TRIG 102 06/12/2020    TRIG 103 11/12/2019     Lab Results   Component Value Date    HDL 81 06/12/2020    HDL 77 11/12/2019     Lab Results   Component Value Date    LDLCALC 54 06/12/2020    LDLCALC 77 11/12/2019     Lab Results   Component Value Date    LABVLDL 20 06/12/2020    LABVLDL 21 11/12/2019     No results found for: [facility]     Lab Results   Component Value Date/Time     06/12/2020 11:03 AM     11/12/2019 10:46 AM    K 4.6 06/12/2020 11:03 AM    K 4.4 11/12/2019 10:46 AM     06/12/2020 11:03 AM     11/12/2019 10:46 AM    CO2 24 06/12/2020 11:03 AM    CO2 25 11/12/2019 10:46 AM    BUN 15 06/12/2020 11:03 AM    BUN 13 11/12/2019 10:46 AM    CREATININE 1.08 06/12/2020 11:03 AM    CREATININE 1.27 11/12/2019 10:46 AM    GLUCOSE 105 06/12/2020 11:03 AM    GLUCOSE 101 11/12/2019 10:46 AM    CALCIUM 9.1 06/12/2020 11:03 AM    CALCIUM 8.9 11/12/2019 10:46 AM         Lab Results   Component Value Date    ALT 29 06/12/2020    ALT 27 11/12/2019    AST 23 06/12/2020    AST 21 11/12/2019        Assessment/Plan:   1. Hyperlipidemia, unspecified hyperlipidemia type  - Continue with Zetia and atorvastatin  - Discussed the lack of robust data for fish oil supplement use to reduce cardiovascular outcomes and discussed that fish oil supplement use may increase the risk of atrial fibrillation  - Discontinue fish oil supplementation    2. Hypertension, unspecified type  - Well-controlled  - PCP note reviewed  - Currently on lisinopril    3.  Hx of CABG  - Continue with baby aspirin daily, atorvastatin, and Zetia    F/U: 6 months    Gifty Lennon MD

## 2022-11-11 ENCOUNTER — OFFICE VISIT (OUTPATIENT)
Dept: CARDIOLOGY CLINIC | Age: 77
End: 2022-11-11
Payer: COMMERCIAL

## 2022-11-11 VITALS
SYSTOLIC BLOOD PRESSURE: 138 MMHG | DIASTOLIC BLOOD PRESSURE: 64 MMHG | WEIGHT: 120.1 LBS | HEIGHT: 63 IN | BODY MASS INDEX: 21.28 KG/M2 | HEART RATE: 76 BPM

## 2022-11-11 DIAGNOSIS — I10 HYPERTENSION, UNSPECIFIED TYPE: ICD-10-CM

## 2022-11-11 DIAGNOSIS — E78.5 HYPERLIPIDEMIA, UNSPECIFIED HYPERLIPIDEMIA TYPE: Primary | ICD-10-CM

## 2022-11-11 DIAGNOSIS — Z95.1 HX OF CABG: ICD-10-CM

## 2022-11-11 PROCEDURE — 3074F SYST BP LT 130 MM HG: CPT | Performed by: INTERNAL MEDICINE

## 2022-11-11 PROCEDURE — 3078F DIAST BP <80 MM HG: CPT | Performed by: INTERNAL MEDICINE

## 2022-11-11 PROCEDURE — 99214 OFFICE O/P EST MOD 30 MIN: CPT | Performed by: INTERNAL MEDICINE

## 2022-11-11 PROCEDURE — 1123F ACP DISCUSS/DSCN MKR DOCD: CPT | Performed by: INTERNAL MEDICINE

## 2023-01-11 RX ORDER — EZETIMIBE 10 MG/1
TABLET ORAL
Qty: 90 TABLET | Refills: 0 | OUTPATIENT
Start: 2023-01-11

## 2023-01-20 ENCOUNTER — OFFICE VISIT (OUTPATIENT)
Dept: ORTHOPEDIC SURGERY | Age: 78
End: 2023-01-20

## 2023-01-20 DIAGNOSIS — M19.041 DEGENERATIVE ARTHRITIS OF METACARPOPHALANGEAL JOINT OF RIGHT THUMB: ICD-10-CM

## 2023-01-20 DIAGNOSIS — M65.341 TRIGGER FINGER, RIGHT RING FINGER: Primary | ICD-10-CM

## 2023-01-20 RX ORDER — CELECOXIB 100 MG/1
100 CAPSULE ORAL 2 TIMES DAILY
Qty: 60 CAPSULE | Refills: 2 | Status: SHIPPED | OUTPATIENT
Start: 2023-01-20 | End: 2023-02-19

## 2023-01-20 RX ORDER — BETAMETHASONE SODIUM PHOSPHATE AND BETAMETHASONE ACETATE 3; 3 MG/ML; MG/ML
6 INJECTION, SUSPENSION INTRA-ARTICULAR; INTRALESIONAL; INTRAMUSCULAR; SOFT TISSUE ONCE
Status: COMPLETED | OUTPATIENT
Start: 2023-01-20 | End: 2023-01-20

## 2023-01-20 RX ADMIN — BETAMETHASONE SODIUM PHOSPHATE AND BETAMETHASONE ACETATE 6 MG: 3; 3 INJECTION, SUSPENSION INTRA-ARTICULAR; INTRALESIONAL; INTRAMUSCULAR; SOFT TISSUE at 11:17

## 2023-01-20 NOTE — PROGRESS NOTES
Orthopaedic Hand Clinic Note    Name: Luh Freed  Age: 78 y.o. YOB: 1945  Gender: female  MRN: [id number]      Follow up visit:   1. Trigger finger, right ring finger    2. 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 female who is following up for right thumb MCP joint arthritis, right ring trigger finger, left thumb MCP arthritis, I have provided multiple injections for all these conditions for a long time, all symptoms continue to recur. ROS/Meds/PSH/PMH/FH/SH: I personally reviewed the patients standard intake form. Pertinents are discussed in the HPI    Physical Examination:  General: Awake and alert. HEENT: Normocephalic, atraumatic  CV/Pulm: Breathing even and unlabored  Skin: No obvious rashes noted. Lymphatic: No obvious evidence of lymphedema or lymphadenopathy    Musculoskeletal Examination:  Examination on the right upper extremity demonstrates cap refill < 5 seconds in all fingers, tenderness palpation of the right thumb MCP joint, 15 degree of ulnar deviation at the right thumb MCP joint, motion of the MCP joint is 5 to 20 degrees at most.  Tenderness palpation of the right ring finger A1 pulley with palpable clicking but no locking today    Imaging / Electrodiagnostic Tests:     Previous x-ray of the right hand was reviewed which demonstrates bone-on-bone arthritis of the right thumb MCP joint with 30 degrees of ulnar deviation    Assessment:   1. Trigger finger, right ring finger    2. 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 We discussed observation, therapy, antiinflammatory medications and other pertinent treatment modalities.     After discussing in detail the patient elects to proceed with right ring finger A1 pulley steroid injection, right thumb MCP joint steroid injection, Celebrex 100 twice daily for 3 months, I will reassess the patient in 3 months. The patient tried Mobic before and she did not tolerate it well. We discussed all treatment options in detail again, the patient is interested in having surgery given that she has had 5 steroid injections for the trigger finger and she continues to have severe pain at the right thumb MCP joint, we discussed right thumb MCP joint arthrodesis as well as right ring trigger finger release surgery in details, she wants to reassess all these conditions in 3 months and possibly plan surgery then but she wanted 1 more injection today to get some relief. Procedure Note    The risk, benefits and alternatives of injection and no injection therapy were discussed. The patient consented for an injection. The patient has been identified by name and birthdate. The injection site was identified, marked and prepped with a alcohol swab. Time out completed. The A1 pulley of the Right ring finger was/were injected with a 25 gauge needle with 1ml of 6mg/ml Betamethasone and 1ml xylocaine plain 1%. The injection site was then dressed with a bandaid. The patient tolerated the injection well. The patient was instructed to monitor their blood sugars if diabetic and call if any concerns. The patient was instructed to call the office if any adverse local effects occurred or any if any questions or concerns arise. Procedure Note    The risk, benefits and alternatives of injection and no injection therapy were discussed. The patient consented for an injection. The patient has been identified by name and birthdate. The injection site was identified, marked and prepped with a alcohol swab. Time out completed. The Right thumb MCP joint was injected with 1ml of 6mg/ml Betamethasone and 1ml xylocaine plain 1%. The injection site was then dressed with a bandaid. The patient tolerated the injection well.  The patient was instructed to monitor their blood sugars if diabetic and call if any concerns. The patient was instructed to call the office if any adverse local effects occurred or any if any questions or concerns arise. \    Patient voiced accordance and understanding of the information provided and the formulated plan. All questions were answered to the patient's satisfaction during the encounter.     Violet Mcadams MD  Orthopaedic Surgery  01/20/23  12:41 PM

## 2023-05-23 NOTE — PROGRESS NOTES
[facility] CARDIOLOGY Follow Up                 Reason for Visit: Hypertension    Subjective:     Patient is a 78 y.o. female with a PMH of hypertension, hyperlipidemia, CAD status post CABG, and breast cancer who presents for follow-up. The patient was last seen in November 2022. Fish oil supplementation was discontinued. The patient denies angina and dyspnea. Past Medical History:   Diagnosis Date    CAD (coronary artery disease)     hyperlipidemia    History of tetanus, diphtheria, and acellular pertussis booster vaccination (Tdap) 2012    due 2022    Hypertension     S/P colonoscopy 2013    due in 2023      Past Surgical History:   Procedure Laterality Date    COLONOSCOPY  2013    due in 2023    GYN      hysterectomy    [facility] LOC BREAST BIOPSY RIGHT Right 3/23/2018    [facility]ASMINE STEROTACTIC LOC BREAST BIOPSY RIGHT [facility] CC AMB HISTORICAL    [place] CARDIAC SURG PROCEDURE UNLIST      01/2011      No family history on file. Social History     Tobacco Use    Smoking status: Former    Smokeless tobacco: Never   Substance Use Topics    Alcohol use: Yes     Alcohol/week: 5.8 standard drinks      No Known Allergies      ROS:  No obvious pertinent positives on review of systems except for what was outlined above.        Objective:       BP (!) 102/52   Pulse 72   Ht 5' 3\" (1.6 m)   Wt 115 lb (52.2 kg)   BMI 20.37 kg/m²     BP Readings from Last 3 Encounters:   05/25/23 (!) 102/52   11/11/22 138/64   12/23/21 (!) 126/52       Wt Readings from Last 3 Encounters:   05/25/23 115 lb (52.2 kg)   11/11/22 120 lb 1.6 oz (54.5 kg)   12/23/21 112 lb 3.2 oz (50.9 kg)       General/Constitutional:   Alert and oriented x 3, no acute distress  HEENT:   normocephalic, atraumatic, moist mucous membranes  Neck:   No JVD or carotid bruits bilaterally  Cardiovascular:   regular rate and rhythm, no rub/gallop appreciated  Pulmonary:   clear to auscultation bilaterally, no respiratory distress  Abdomen:   soft, non-tender,

## 2023-05-25 ENCOUNTER — OFFICE VISIT (OUTPATIENT)
Age: 78
End: 2023-05-25
Payer: COMMERCIAL

## 2023-05-25 ENCOUNTER — TELEPHONE (OUTPATIENT)
Dept: ORTHOPEDIC SURGERY | Age: 78
End: 2023-05-25

## 2023-05-25 VITALS
WEIGHT: 115 LBS | SYSTOLIC BLOOD PRESSURE: 102 MMHG | BODY MASS INDEX: 20.38 KG/M2 | DIASTOLIC BLOOD PRESSURE: 52 MMHG | HEIGHT: 63 IN | HEART RATE: 72 BPM

## 2023-05-25 DIAGNOSIS — Z95.1 HX OF CABG: ICD-10-CM

## 2023-05-25 DIAGNOSIS — M19.041 DEGENERATIVE ARTHRITIS OF METACARPOPHALANGEAL JOINT OF RIGHT THUMB: ICD-10-CM

## 2023-05-25 DIAGNOSIS — M65.341 TRIGGER FINGER, RIGHT RING FINGER: Primary | ICD-10-CM

## 2023-05-25 DIAGNOSIS — E78.5 HYPERLIPIDEMIA, UNSPECIFIED HYPERLIPIDEMIA TYPE: ICD-10-CM

## 2023-05-25 DIAGNOSIS — I10 HYPERTENSION, UNSPECIFIED TYPE: Primary | ICD-10-CM

## 2023-05-25 DIAGNOSIS — M19.042 DEGENERATIVE ARTHRITIS OF METACARPOPHALANGEAL JOINT OF LEFT THUMB: ICD-10-CM

## 2023-05-25 PROCEDURE — 3074F SYST BP LT 130 MM HG: CPT | Performed by: INTERNAL MEDICINE

## 2023-05-25 PROCEDURE — 1123F ACP DISCUSS/DSCN MKR DOCD: CPT | Performed by: INTERNAL MEDICINE

## 2023-05-25 PROCEDURE — 99214 OFFICE O/P EST MOD 30 MIN: CPT | Performed by: INTERNAL MEDICINE

## 2023-05-25 PROCEDURE — 3078F DIAST BP <80 MM HG: CPT | Performed by: INTERNAL MEDICINE

## 2023-05-25 RX ORDER — MAGNESIUM 200 MG
200 TABLET ORAL DAILY
COMMUNITY

## 2023-05-25 RX ORDER — LISINOPRIL 10 MG/1
10 TABLET ORAL DAILY
Qty: 90 TABLET | Refills: 3 | Status: SHIPPED | OUTPATIENT
Start: 2023-05-25

## 2023-05-25 RX ORDER — CELECOXIB 100 MG/1
100 CAPSULE ORAL 2 TIMES DAILY
Qty: 60 CAPSULE | Refills: 0 | Status: SHIPPED | OUTPATIENT
Start: 2023-05-25 | End: 2023-06-24

## 2023-07-26 ENCOUNTER — OFFICE VISIT (OUTPATIENT)
Dept: ORTHOPEDIC SURGERY | Age: 78
End: 2023-07-26

## 2023-07-26 DIAGNOSIS — M19.041 DEGENERATIVE ARTHRITIS OF METACARPOPHALANGEAL JOINT OF RIGHT THUMB: ICD-10-CM

## 2023-07-26 DIAGNOSIS — M65.341 TRIGGER FINGER, RIGHT RING FINGER: Primary | ICD-10-CM

## 2023-07-26 RX ORDER — CELECOXIB 100 MG/1
100 CAPSULE ORAL 2 TIMES DAILY
Qty: 180 CAPSULE | Refills: 0 | Status: SHIPPED | OUTPATIENT
Start: 2023-07-26 | End: 2023-10-24

## 2023-07-26 RX ORDER — BETAMETHASONE SODIUM PHOSPHATE AND BETAMETHASONE ACETATE 3; 3 MG/ML; MG/ML
6 INJECTION, SUSPENSION INTRA-ARTICULAR; INTRALESIONAL; INTRAMUSCULAR; SOFT TISSUE ONCE
Status: COMPLETED | OUTPATIENT
Start: 2023-07-26 | End: 2023-07-26

## 2023-07-26 RX ADMIN — BETAMETHASONE SODIUM PHOSPHATE AND BETAMETHASONE ACETATE 6 MG: 3; 3 INJECTION, SUSPENSION INTRA-ARTICULAR; INTRALESIONAL; INTRAMUSCULAR; SOFT TISSUE at 11:38

## 2023-07-26 NOTE — PROGRESS NOTES
Orthopaedic Hand Clinic Note    Name: Dakota Zhao  Age: 78 y.o. YOB: 1945  Gender: female  MRN: [id number]      Follow up visit:   1. Trigger finger, right ring finger    2. 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 female who is following up for bilateral thumb MCP arthritis, right ring trigger finger. ROS/Meds/PSH/PMH/FH/SH: I personally reviewed the patients standard intake form. Pertinents are discussed in the HPI    Physical Examination:  General: Awake and alert. HEENT: Normocephalic, atraumatic  CV/Pulm: Breathing even and unlabored  Skin: No obvious rashes noted. Lymphatic: No obvious evidence of lymphedema or lymphadenopathy    Musculoskeletal Examination:  Examination on the right upper extremity demonstrates cap refill < 5 seconds in all fingers, tenderness palpation of the right thumb MCP joint, to palpation of the right ring finger A1 pulley with palpable clicking but no locking, limited finger motion due to pain however, after injection she had full flexion of the ring finger. Imaging / Electrodiagnostic Tests:     none    Assessment:   1. Trigger finger, right ring finger    2. 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 We discussed observation, therapy, antiinflammatory medications and other pertinent treatment modalities. After discussing in detail the patient elects to proceed with right ring finger A1 pulley steroid injection, right thumb MCP joint steroid injection, Celebrex 100 twice daily for 3 months. Procedure Note    The risk, benefits and alternatives of injection and no injection therapy were discussed. The patient consented for an injection. The patient has been identified by name and birthdate.  The injection site was identified, marked and prepped with a alcohol

## 2023-09-29 RX ORDER — CELECOXIB 100 MG/1
100 CAPSULE ORAL 2 TIMES DAILY
Qty: 180 CAPSULE | Refills: 0 | OUTPATIENT
Start: 2023-09-29

## 2023-10-03 DIAGNOSIS — M65.341 TRIGGER FINGER, RIGHT RING FINGER: ICD-10-CM

## 2023-10-06 RX ORDER — CELECOXIB 100 MG/1
100 CAPSULE ORAL 2 TIMES DAILY
Qty: 180 CAPSULE | OUTPATIENT
Start: 2023-10-06

## 2023-11-28 NOTE — PROGRESS NOTES
respiratory distress  Abdomen:   soft, non-tender, non-distended  Ext:   No sig LE edema bilaterally  Skin:  warm and dry, no obvious rashes seen  Neuro:   no obvious sensory or motor deficits  Psychiatric:   normal mood and affect    Data Review:   Lab Results   Component Value Date    CHOL 155 06/12/2020    CHOL 175 11/12/2019     Lab Results   Component Value Date    TRIG 102 06/12/2020    TRIG 103 11/12/2019     Lab Results   Component Value Date    HDL 81 06/12/2020    HDL 77 11/12/2019     Lab Results   Component Value Date    LDLCALC 54 06/12/2020    LDLCALC 77 11/12/2019     Lab Results   Component Value Date    LABVLDL 20 06/12/2020    LABVLDL 21 11/12/2019     No results found for: \"CHOLHDLRATIO\"     Lab Results   Component Value Date/Time     06/12/2020 11:03 AM     11/12/2019 10:46 AM    K 4.6 06/12/2020 11:03 AM    K 4.4 11/12/2019 10:46 AM     06/12/2020 11:03 AM     11/12/2019 10:46 AM    CO2 24 06/12/2020 11:03 AM    CO2 25 11/12/2019 10:46 AM    BUN 15 06/12/2020 11:03 AM    BUN 13 11/12/2019 10:46 AM    CREATININE 1.08 06/12/2020 11:03 AM    CREATININE 1.27 11/12/2019 10:46 AM    GLUCOSE 105 06/12/2020 11:03 AM    GLUCOSE 101 11/12/2019 10:46 AM    CALCIUM 9.1 06/12/2020 11:03 AM    CALCIUM 8.9 11/12/2019 10:46 AM         Lab Results   Component Value Date    ALT 29 06/12/2020    ALT 27 11/12/2019    AST 23 06/12/2020    AST 21 11/12/2019        Assessment/Plan:   1. Hypertension, unspecified type  - Well-controlled  - Continue with lisinopril    2. Hyperlipidemia, unspecified hyperlipidemia type  - Continue with Lipitor  - Discontinue Zetia with no compelling indication for use    3.  Hx of CABG  - Continue with baby aspirin daily and Lipitor    F/U: 6 months    Dre Mcleod MD

## 2023-11-30 ENCOUNTER — OFFICE VISIT (OUTPATIENT)
Age: 78
End: 2023-11-30
Payer: COMMERCIAL

## 2023-11-30 ENCOUNTER — TELEPHONE (OUTPATIENT)
Dept: ORTHOPEDIC SURGERY | Age: 78
End: 2023-11-30

## 2023-11-30 VITALS
HEART RATE: 64 BPM | SYSTOLIC BLOOD PRESSURE: 130 MMHG | DIASTOLIC BLOOD PRESSURE: 60 MMHG | BODY MASS INDEX: 20.2 KG/M2 | HEIGHT: 63 IN | WEIGHT: 114 LBS

## 2023-11-30 DIAGNOSIS — I10 HYPERTENSION, UNSPECIFIED TYPE: Primary | ICD-10-CM

## 2023-11-30 DIAGNOSIS — E78.5 HYPERLIPIDEMIA, UNSPECIFIED HYPERLIPIDEMIA TYPE: ICD-10-CM

## 2023-11-30 DIAGNOSIS — Z95.1 HX OF CABG: ICD-10-CM

## 2023-11-30 PROCEDURE — 1123F ACP DISCUSS/DSCN MKR DOCD: CPT | Performed by: INTERNAL MEDICINE

## 2023-11-30 PROCEDURE — 99214 OFFICE O/P EST MOD 30 MIN: CPT | Performed by: INTERNAL MEDICINE

## 2023-11-30 PROCEDURE — 3075F SYST BP GE 130 - 139MM HG: CPT | Performed by: INTERNAL MEDICINE

## 2023-11-30 PROCEDURE — 3078F DIAST BP <80 MM HG: CPT | Performed by: INTERNAL MEDICINE

## 2023-11-30 RX ORDER — EZETIMIBE 10 MG/1
10 TABLET ORAL DAILY
Qty: 90 TABLET | Refills: 3 | Status: CANCELLED | OUTPATIENT
Start: 2023-11-30

## 2023-11-30 RX ORDER — LISINOPRIL 10 MG/1
10 TABLET ORAL DAILY
Qty: 90 TABLET | Refills: 3 | Status: SHIPPED | OUTPATIENT
Start: 2023-11-30

## 2023-11-30 RX ORDER — ATORVASTATIN CALCIUM 80 MG/1
80 TABLET, FILM COATED ORAL DAILY
Qty: 90 TABLET | Refills: 3 | Status: SHIPPED | OUTPATIENT
Start: 2023-11-30

## 2024-01-10 ENCOUNTER — OFFICE VISIT (OUTPATIENT)
Dept: ORTHOPEDIC SURGERY | Age: 79
End: 2024-01-10

## 2024-01-10 DIAGNOSIS — M19.042 DEGENERATIVE ARTHRITIS OF METACARPOPHALANGEAL JOINT OF LEFT THUMB: ICD-10-CM

## 2024-01-10 DIAGNOSIS — M19.041 DEGENERATIVE ARTHRITIS OF METACARPOPHALANGEAL JOINT OF RIGHT THUMB: Primary | ICD-10-CM

## 2024-01-10 DIAGNOSIS — M65.341 TRIGGER FINGER, RIGHT RING FINGER: ICD-10-CM

## 2024-01-10 RX ORDER — CELECOXIB 100 MG/1
100 CAPSULE ORAL 2 TIMES DAILY
Qty: 180 CAPSULE | Refills: 1 | Status: SHIPPED | OUTPATIENT
Start: 2024-01-10

## 2024-01-10 RX ORDER — CEPHALEXIN 500 MG/1
500 CAPSULE ORAL 4 TIMES DAILY
Qty: 40 CAPSULE | Refills: 0 | Status: SHIPPED | OUTPATIENT
Start: 2024-01-10 | End: 2024-01-20

## 2024-01-10 RX ORDER — BETAMETHASONE SODIUM PHOSPHATE AND BETAMETHASONE ACETATE 3; 3 MG/ML; MG/ML
6 INJECTION, SUSPENSION INTRA-ARTICULAR; INTRALESIONAL; INTRAMUSCULAR; SOFT TISSUE ONCE
Status: COMPLETED | OUTPATIENT
Start: 2024-01-10 | End: 2024-01-10

## 2024-01-10 RX ADMIN — BETAMETHASONE SODIUM PHOSPHATE AND BETAMETHASONE ACETATE 6 MG: 3; 3 INJECTION, SUSPENSION INTRA-ARTICULAR; INTRALESIONAL; INTRAMUSCULAR; SOFT TISSUE at 13:02

## 2024-01-10 NOTE — PROGRESS NOTES
Orthopaedic Hand Clinic Note    Name: Samantha Tucker  Age: 78 y.o.  YOB: 1945  Gender: female  MRN: [id number]      Follow up visit:   1. 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 Degenerative arthritis of metacarpophalangeal joint of left thumb    3. Trigger finger, right ring finger        HPI: Samantha Tucker is a 78 y.o. female who is following up for right thumb MCP arthritis, right ring trigger finger, she is having more pain on the left thumb now.  She has had multiple injections for right thumb MCP and right ring finger A1 pulley with varying degrees of success.      ROS/Meds/PSH/PMH/FH/SH: I personally reviewed the patients standard intake form.  Pertinents are discussed in the HPI    Physical Examination:  General: Awake and alert.  HEENT: Normocephalic, atraumatic  CV/Pulm: Breathing even and unlabored  Skin: No obvious rashes noted.  Lymphatic: No obvious evidence of lymphedema or lymphadenopathy    Musculoskeletal Examination:  Examination on the bilateral upper extremity demonstrates cap refill < 5 seconds in all fingers, severe tenderness palpation of the right thumb MCP joint, mild tenderness palpation of the left thumb MCP joint, moderate tense palpation of the right ring finger A1 pulley with a palpable mass at the A1 pulley area, she is unable make a full composite fist.    Imaging / Electrodiagnostic Tests:     Previous x-rays were again reviewed, they demonstrate bone-on-bone arthritis of the right thumb MCP joint, advanced osteoarthritis of the left thumb CMC joint with narrowing of the MCP joint of the thumb as well, these x-rays are 4 years old    Assessment:   1. 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 Degenerative arthritis of metacarpophalangeal joint of left thumb    3. Trigger finger, right ring finger        Plan:   We discussed the diagnosis and different treatment options. We

## 2024-06-05 ENCOUNTER — OFFICE VISIT (OUTPATIENT)
Age: 79
End: 2024-06-05
Payer: COMMERCIAL

## 2024-06-05 DIAGNOSIS — M65.331 TRIGGER FINGER, RIGHT MIDDLE FINGER: ICD-10-CM

## 2024-06-05 DIAGNOSIS — M19.042 DEGENERATIVE ARTHRITIS OF METACARPOPHALANGEAL JOINT OF LEFT THUMB: ICD-10-CM

## 2024-06-05 DIAGNOSIS — M19.041 DEGENERATIVE ARTHRITIS OF METACARPOPHALANGEAL JOINT OF RIGHT THUMB: Primary | ICD-10-CM

## 2024-06-05 DIAGNOSIS — M65.341 TRIGGER FINGER, RIGHT RING FINGER: ICD-10-CM

## 2024-06-05 PROCEDURE — 20600 DRAIN/INJ JOINT/BURSA W/O US: CPT | Performed by: ORTHOPAEDIC SURGERY

## 2024-06-05 PROCEDURE — 20550 NJX 1 TENDON SHEATH/LIGAMENT: CPT | Performed by: ORTHOPAEDIC SURGERY

## 2024-06-05 PROCEDURE — 99214 OFFICE O/P EST MOD 30 MIN: CPT | Performed by: ORTHOPAEDIC SURGERY

## 2024-06-05 PROCEDURE — 1123F ACP DISCUSS/DSCN MKR DOCD: CPT | Performed by: ORTHOPAEDIC SURGERY

## 2024-06-05 RX ORDER — BETAMETHASONE SODIUM PHOSPHATE AND BETAMETHASONE ACETATE 3; 3 MG/ML; MG/ML
6 INJECTION, SUSPENSION INTRA-ARTICULAR; INTRALESIONAL; INTRAMUSCULAR; SOFT TISSUE ONCE
Status: COMPLETED | OUTPATIENT
Start: 2024-06-05 | End: 2024-06-05

## 2024-06-05 RX ADMIN — BETAMETHASONE SODIUM PHOSPHATE AND BETAMETHASONE ACETATE 6 MG: 3; 3 INJECTION, SUSPENSION INTRA-ARTICULAR; INTRALESIONAL; INTRAMUSCULAR; SOFT TISSUE at 14:00

## 2024-06-05 NOTE — PROGRESS NOTES
Degenerative arthritis of metacarpophalangeal joint of left thumb    3. Trigger finger, right ring finger    4. Trigger finger, right middle finger        Plan:   We discussed the diagnosis and different treatment options. We discussed observation, therapy, antiinflammatory medications and other pertinent treatment modalities.    After discussing in detail the patient elects to proceed with bilateral thumb MCP joint steroid injections, right middle finger A1 pulley steroid injection, Celebrex 100 daily.  We discussed all treatment options including surgery, she would like to have surgery given that these injections do not provide long-lasting relief, surgery would entail a right thumb MCP joint arthrodesis and right ultrasound-guided trigger finger release.    Procedure Note    The risk, benefits and alternatives of injection and no injection therapy were discussed. The patient consented for an injection. The patient has been identified by name and birthdate. The injection site was identified, marked and prepped with a alcohol swab. Time out completed. The Bilateral thumb MCP joints was injected with 1ml of 6mg/ml Betamethasone and 1ml xylocaine plain 1%. The injection site was then dressed with a bandaid. The patient tolerated the injection well. The patient was instructed to monitor their blood sugars if diabetic and call if any concerns. The patient was instructed to call the office if any adverse local effects occurred or any if any questions or concerns arise.    Procedure Note    The risk, benefits and alternatives of injection and no injection therapy were discussed. The patient consented for an injection. The patient has been identified by name and birthdate. The injection site was identified, marked and prepped with a alcohol swab. Time out completed. The A1 pulley of the Right middle finger was/were injected with a 25 gauge needle with 1ml of 6mg/ml Betamethasone and 1ml xylocaine plain 1%. The injection

## 2024-06-10 NOTE — PROGRESS NOTES
[facility] CARDIOLOGY Follow Up                 Reason for Visit: Hx of CABG    Subjective:     Patient is a 78 y.o. female with a PMH of hypertension, hyperlipidemia, CAD status post CABG, and breast cancer who presents for follow-up.  The patient was last seen in November 2023.  Zetia was discontinued.  The patient denies angina and dyspnea.  She mows her own lawn.      Past Medical History:   Diagnosis Date    CAD (coronary artery disease)     hyperlipidemia    History of tetanus, diphtheria, and acellular pertussis booster vaccination (Tdap) 2012    due 2022    Hypertension     S/P colonoscopy 2013    due in 2023      Past Surgical History:   Procedure Laterality Date    COLONOSCOPY  2013    due in 2023    GYN      hysterectomy    [facility] BREAST BIOPSY RIGHT Right 3/23/2018    [facility]INE STEROTACTIC LOC BREAST BIOPSY RIGHT [facility] AMB HISTORICAL    [place] UNLISTED PROCEDURE CARDIAC SURGERY      01/2011      No family history on file.   Social History     Tobacco Use    Smoking status: Former     Passive exposure: Never    Smokeless tobacco: Never   Substance Use Topics    Alcohol use: Yes     Alcohol/week: 5.8 standard drinks of alcohol      No Known Allergies      ROS:  No obvious pertinent positives on review of systems except for what was outlined above.       Objective:       BP (!) 120/58   Pulse 68   Ht 1.6 m (5' 3\")   Wt 52.1 kg (114 lb 12.8 oz)   BMI 20.34 kg/m²     BP Readings from Last 3 Encounters:   06/11/24 (!) 120/58   11/30/23 130/60   05/25/23 (!) 102/52       Wt Readings from Last 3 Encounters:   06/11/24 52.1 kg (114 lb 12.8 oz)   11/30/23 51.7 kg (114 lb)   05/25/23 52.2 kg (115 lb)       General/Constitutional:   Alert and oriented x 3, no acute distress  HEENT:   normocephalic, atraumatic, moist mucous membranes  Neck:   No JVD or carotid bruits bilaterally  Cardiovascular:   regular rate and rhythm, no rub/gallop appreciated  Pulmonary:   clear to auscultation bilaterally, no respiratory

## 2024-06-11 ENCOUNTER — OFFICE VISIT (OUTPATIENT)
Age: 79
End: 2024-06-11
Payer: COMMERCIAL

## 2024-06-11 VITALS
BODY MASS INDEX: 20.34 KG/M2 | DIASTOLIC BLOOD PRESSURE: 58 MMHG | SYSTOLIC BLOOD PRESSURE: 120 MMHG | HEART RATE: 68 BPM | WEIGHT: 114.8 LBS | HEIGHT: 63 IN

## 2024-06-11 DIAGNOSIS — E78.5 HYPERLIPIDEMIA, UNSPECIFIED HYPERLIPIDEMIA TYPE: ICD-10-CM

## 2024-06-11 DIAGNOSIS — Z95.1 HX OF CABG: ICD-10-CM

## 2024-06-11 DIAGNOSIS — I10 HYPERTENSION, UNSPECIFIED TYPE: Primary | ICD-10-CM

## 2024-06-11 PROCEDURE — 3074F SYST BP LT 130 MM HG: CPT | Performed by: INTERNAL MEDICINE

## 2024-06-11 PROCEDURE — 99214 OFFICE O/P EST MOD 30 MIN: CPT | Performed by: INTERNAL MEDICINE

## 2024-06-11 PROCEDURE — 1123F ACP DISCUSS/DSCN MKR DOCD: CPT | Performed by: INTERNAL MEDICINE

## 2024-06-11 PROCEDURE — 3078F DIAST BP <80 MM HG: CPT | Performed by: INTERNAL MEDICINE

## 2024-07-23 ENCOUNTER — TELEPHONE (OUTPATIENT)
Age: 79
End: 2024-07-23

## 2024-07-23 NOTE — TELEPHONE ENCOUNTER
----- Message from Yaakov Tolbert MD sent at 7/23/2024  3:47 PM EDT -----  Please let the patient know that the heart function is normal on ECHO.

## 2024-10-16 ENCOUNTER — OFFICE VISIT (OUTPATIENT)
Age: 79
End: 2024-10-16

## 2024-10-16 DIAGNOSIS — M19.042 DEGENERATIVE ARTHRITIS OF METACARPOPHALANGEAL JOINT OF LEFT THUMB: ICD-10-CM

## 2024-10-16 DIAGNOSIS — M19.041 DEGENERATIVE ARTHRITIS OF METACARPOPHALANGEAL JOINT OF RIGHT THUMB: Primary | ICD-10-CM

## 2024-10-16 DIAGNOSIS — M65.331 TRIGGER FINGER, RIGHT MIDDLE FINGER: ICD-10-CM

## 2024-10-16 DIAGNOSIS — M65.341 TRIGGER FINGER, RIGHT RING FINGER: ICD-10-CM

## 2024-10-16 RX ORDER — BETAMETHASONE SODIUM PHOSPHATE AND BETAMETHASONE ACETATE 3; 3 MG/ML; MG/ML
6 INJECTION, SUSPENSION INTRA-ARTICULAR; INTRALESIONAL; INTRAMUSCULAR; SOFT TISSUE ONCE
Status: SHIPPED | OUTPATIENT
Start: 2024-10-16

## 2024-10-16 NOTE — PROGRESS NOTES
Furthermore, the vast majority of patients require surgical release at some point despite some short-term benefits of conservative treatment.  After discussing in detail the patient elects to proceed with right middle and ring finger A1 pulley steroid injections, we discussed surgery, at this point she would like to have more injections, she will come back in 2 months and reassess all conditions.    Procedure Note    The risk, benefits and alternatives of injection and no injection therapy were discussed. The patient consented for an injection. The patient has been identified by name and birthdate. The injection site was identified, marked and prepped with a alcohol swab. Time out completed. The A1 pulley of the Right middle and ring finger was/were injected with a 25 gauge needle with 1ml of 6mg/ml Betamethasone and 1ml xylocaine plain 1%. The injection site was then dressed with a bandaid. The patient tolerated the injection well. The patient was instructed to monitor their blood sugars if diabetic and call if any concerns. The patient was instructed to call the office if any adverse local effects occurred or any if any questions or concerns arise.        Patient voiced accordance and understanding of the information provided and the formulated plan. All questions were answered to the patient's satisfaction during the encounter.    Stacie Sawyer MD  Orthopaedic Surgery  10/16/24  2:01 PM

## 2024-12-15 NOTE — PROGRESS NOTES
[facility] [email or web address]] CARDIOLOGY Follow Up                 Reason for Visit: CCD    Subjective:     Patient is a 78 y.o. female with a PMH of hypertension, hyperlipidemia, CAD status post CABG, and breast cancer who presents for follow-up.  The patient was last seen in June 2024.  A TTE was ordered with a history of CABG.  The patient had a TTE in July 2024 that noted a normal EF and mild MR.  The patient denies angina and dyspnea.  She reports palpitations only when she is anxious.  She had a cup of coffee this morning and feels anxiety because she is seeing a physician.    Past Medical History:   Diagnosis Date    CAD (coronary artery disease)     hyperlipidemia    History of tetanus, diphtheria, and acellular pertussis booster vaccination (Tdap) 2012    due 2022    Hypertension     S/P colonoscopy 2013    due in 2023      Past Surgical History:   Procedure Laterality Date    COLONOSCOPY  2013    due in 2023    GYN      hysterectomy    [facility] LOC BREAST BIOPSY RIGHT Right 3/23/2018    [facility] STEROTACTIC LOC BREAST BIOPSY RIGHT [facility] AMB HISTORICAL    [place] UNLISTED PROCEDURE CARDIAC SURGERY      01/2011      No family history on file.   Social History     Tobacco Use    Smoking status: Former     Passive exposure: Never    Smokeless tobacco: Never   Substance Use Topics    Alcohol use: Yes     Alcohol/week: 5.8 standard drinks of alcohol      No Known Allergies      ROS:  No obvious pertinent positives on review of systems except for what was outlined above.       Objective:       /68   Pulse 72   Ht 1.6 m (5' 3\")   Wt 52.6 kg (116 lb) Comment: with shoes  BMI 20.55 kg/m²     BP Readings from Last 3 Encounters:   12/17/24 118/68   07/23/24 116/60   06/11/24 (!) 120/58       Wt Readings from Last 3 Encounters:   12/17/24 52.6 kg (116 lb)   07/23/24 51.7 kg (114 lb)   06/11/24 52.1 kg (114 lb 12.8 oz)       General/Constitutional:   Alert and oriented x 3, no acute distress  HEENT:   normocephalic, atraumatic,

## 2024-12-17 ENCOUNTER — OFFICE VISIT (OUTPATIENT)
Age: 79
End: 2024-12-17
Payer: MEDICARE

## 2024-12-17 VITALS
WEIGHT: 116 LBS | HEART RATE: 72 BPM | HEIGHT: 63 IN | BODY MASS INDEX: 20.55 KG/M2 | DIASTOLIC BLOOD PRESSURE: 68 MMHG | SYSTOLIC BLOOD PRESSURE: 118 MMHG

## 2024-12-17 DIAGNOSIS — I49.1 PAC (PREMATURE ATRIAL CONTRACTION): ICD-10-CM

## 2024-12-17 DIAGNOSIS — I10 HYPERTENSION, UNSPECIFIED TYPE: Primary | ICD-10-CM

## 2024-12-17 DIAGNOSIS — E78.5 HYPERLIPIDEMIA, UNSPECIFIED HYPERLIPIDEMIA TYPE: ICD-10-CM

## 2024-12-17 DIAGNOSIS — Z95.1 HX OF CABG: ICD-10-CM

## 2024-12-17 PROCEDURE — G8484 FLU IMMUNIZE NO ADMIN: HCPCS | Performed by: INTERNAL MEDICINE

## 2024-12-17 PROCEDURE — 3074F SYST BP LT 130 MM HG: CPT | Performed by: INTERNAL MEDICINE

## 2024-12-17 PROCEDURE — G8420 CALC BMI NORM PARAMETERS: HCPCS | Performed by: INTERNAL MEDICINE

## 2024-12-17 PROCEDURE — 3078F DIAST BP <80 MM HG: CPT | Performed by: INTERNAL MEDICINE

## 2024-12-17 PROCEDURE — G8427 DOCREV CUR MEDS BY ELIG CLIN: HCPCS | Performed by: INTERNAL MEDICINE

## 2024-12-17 PROCEDURE — 1126F AMNT PAIN NOTED NONE PRSNT: CPT | Performed by: INTERNAL MEDICINE

## 2024-12-17 PROCEDURE — 93000 ELECTROCARDIOGRAM COMPLETE: CPT | Performed by: INTERNAL MEDICINE

## 2024-12-17 PROCEDURE — 1123F ACP DISCUSS/DSCN MKR DOCD: CPT | Performed by: INTERNAL MEDICINE

## 2024-12-17 PROCEDURE — 1159F MED LIST DOCD IN RCRD: CPT | Performed by: INTERNAL MEDICINE

## 2024-12-17 PROCEDURE — 99214 OFFICE O/P EST MOD 30 MIN: CPT | Performed by: INTERNAL MEDICINE

## 2024-12-17 PROCEDURE — G8399 PT W/DXA RESULTS DOCUMENT: HCPCS | Performed by: INTERNAL MEDICINE

## 2024-12-17 PROCEDURE — 1036F TOBACCO NON-USER: CPT | Performed by: INTERNAL MEDICINE

## 2024-12-17 PROCEDURE — 1090F PRES/ABSN URINE INCON ASSESS: CPT | Performed by: INTERNAL MEDICINE

## 2024-12-18 RX ORDER — CEPHALEXIN 500 MG/1
CAPSULE ORAL
Qty: 40 CAPSULE | OUTPATIENT
Start: 2024-12-18

## 2025-02-24 ENCOUNTER — OFFICE VISIT (OUTPATIENT)
Age: 80
End: 2025-02-24
Payer: MEDICARE

## 2025-02-24 DIAGNOSIS — M65.341 TRIGGER FINGER, RIGHT RING FINGER: ICD-10-CM

## 2025-02-24 DIAGNOSIS — M65.331 TRIGGER FINGER, RIGHT MIDDLE FINGER: ICD-10-CM

## 2025-02-24 DIAGNOSIS — M19.041 DEGENERATIVE ARTHRITIS OF METACARPOPHALANGEAL JOINT OF RIGHT THUMB: Primary | ICD-10-CM

## 2025-02-24 DIAGNOSIS — M19.042 DEGENERATIVE ARTHRITIS OF METACARPOPHALANGEAL JOINT OF LEFT THUMB: ICD-10-CM

## 2025-02-24 PROCEDURE — 1036F TOBACCO NON-USER: CPT | Performed by: ORTHOPAEDIC SURGERY

## 2025-02-24 PROCEDURE — 1123F ACP DISCUSS/DSCN MKR DOCD: CPT | Performed by: ORTHOPAEDIC SURGERY

## 2025-02-24 PROCEDURE — 1090F PRES/ABSN URINE INCON ASSESS: CPT | Performed by: ORTHOPAEDIC SURGERY

## 2025-02-24 PROCEDURE — 20550 NJX 1 TENDON SHEATH/LIGAMENT: CPT | Performed by: ORTHOPAEDIC SURGERY

## 2025-02-24 PROCEDURE — G8420 CALC BMI NORM PARAMETERS: HCPCS | Performed by: ORTHOPAEDIC SURGERY

## 2025-02-24 PROCEDURE — G8399 PT W/DXA RESULTS DOCUMENT: HCPCS | Performed by: ORTHOPAEDIC SURGERY

## 2025-02-24 PROCEDURE — 99214 OFFICE O/P EST MOD 30 MIN: CPT | Performed by: ORTHOPAEDIC SURGERY

## 2025-02-24 PROCEDURE — G8428 CUR MEDS NOT DOCUMENT: HCPCS | Performed by: ORTHOPAEDIC SURGERY

## 2025-02-24 RX ORDER — BETAMETHASONE SODIUM PHOSPHATE AND BETAMETHASONE ACETATE 3; 3 MG/ML; MG/ML
6 INJECTION, SUSPENSION INTRA-ARTICULAR; INTRALESIONAL; INTRAMUSCULAR; SOFT TISSUE ONCE
Status: COMPLETED | OUTPATIENT
Start: 2025-02-24 | End: 2025-02-24

## 2025-02-24 RX ADMIN — BETAMETHASONE SODIUM PHOSPHATE AND BETAMETHASONE ACETATE 6 MG: 3; 3 INJECTION, SUSPENSION INTRA-ARTICULAR; INTRALESIONAL; INTRAMUSCULAR; SOFT TISSUE at 13:41

## 2025-02-24 NOTE — PROGRESS NOTES
Orthopaedic Hand Surgery Note    Name: Samantha Tucker  Age: 79 y.o.  YOB: 1945  Gender: female  MRN: [id number]    CC: Follow up for trigger finger    HPI: Patient is a 79 y.o. female who returns today for reevaluation of a right middle and ring trigger finger. Last visit we provided steroid injections, the injections provide good relief but symptoms recur after 3 months.    ROS/Meds/PSH/PMH/FH/SH: I personally reviewed the patients standard intake form.  Pertinents are discussed in the HPI    Physical Examination:  General: Awake and alert.  HEENT: Normocephalic, atraumatic  CV/Pulm: Breathing even and unlabored  Skin: No obvious rashes noted.  Lymphatic: No obvious evidence of lymphedema or lymphadenopathy    Musculoskeletal:   Examination on the right upper extremity demonstrates, Normal sensation and good cap refill in all fingers, Positive tenderness over the middle and ring A1 pulley with palpable clicking and Negative  locking.  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 We discussed observation, splinting, cortisone injections and surgical release of the A1 pulley. We discussed that stenosing tenosynovitis at the level of the A1 pulley AKA trigger finger is a chronic condition regardless of how long the symptoms have been present, this most likely has been progressing for much longer than the symptoms were evident and it will likely persist for a long time without medical treatment. Furthermore, the vast majority of patients require surgical release at some point despite some short-term benefits of conservative treatment.  After discussing in detail the patient

## 2025-06-29 NOTE — PROGRESS NOTES
regular rate and rhythm, no rub/gallop appreciated  Pulmonary:   clear to auscultation bilaterally, no respiratory distress  Abdomen:   soft, non-tender, non-distended  Ext:   No sig LE edema bilaterally; telangiectasia noted bilaterally in the LEs  Skin:  warm and dry, no obvious rashes seen  Neuro:   no obvious sensory or motor deficits  Psychiatric:   normal mood and affect    Data Review:   Lab Results   Component Value Date    CHOL 155 06/12/2020    CHOL 175 11/12/2019     Lab Results   Component Value Date    TRIG 102 06/12/2020    TRIG 103 11/12/2019     Lab Results   Component Value Date    HDL 81 06/12/2020    HDL 77 11/12/2019     No components found for: \"LDLCHOLESTEROL\", \"LDLCALC\"  Lab Results   Component Value Date    VLDL 20 06/12/2020    VLDL 21 11/12/2019     No results found for: \"CHOLHDLRATIO\"     Lab Results   Component Value Date/Time     06/12/2020 11:03 AM     11/12/2019 10:46 AM    K 4.6 06/12/2020 11:03 AM    K 4.4 11/12/2019 10:46 AM     06/12/2020 11:03 AM     11/12/2019 10:46 AM    CO2 24 06/12/2020 11:03 AM    CO2 25 11/12/2019 10:46 AM    BUN 15 06/12/2020 11:03 AM    BUN 13 11/12/2019 10:46 AM    CREATININE 1.08 06/12/2020 11:03 AM    CREATININE 1.27 11/12/2019 10:46 AM    GLUCOSE 105 06/12/2020 11:03 AM    GLUCOSE 101 11/12/2019 10:46 AM    CALCIUM 9.1 06/12/2020 11:03 AM    CALCIUM 8.9 11/12/2019 10:46 AM         Lab Results   Component Value Date    ALT 29 06/12/2020    ALT 27 11/12/2019    AST 23 06/12/2020    AST 21 11/12/2019        Assessment/Plan:   1. Chronic venous insufficiency  - Educated on leg elevation and compression stockings    2. Hypertension, unspecified type  - Well-controlled  - Currently on lisinopril    3. Hyperlipidemia, unspecified hyperlipidemia type  - Continue with Lipitor    4. Hx of CABG  - Continue with baby aspirin daily and Lipitor    F/U: 6 months    Yaakov Tolbert MD

## 2025-07-01 ENCOUNTER — OFFICE VISIT (OUTPATIENT)
Age: 80
End: 2025-07-01
Payer: MEDICARE

## 2025-07-01 VITALS
DIASTOLIC BLOOD PRESSURE: 60 MMHG | HEART RATE: 61 BPM | HEIGHT: 63 IN | SYSTOLIC BLOOD PRESSURE: 130 MMHG | WEIGHT: 119 LBS | BODY MASS INDEX: 21.09 KG/M2

## 2025-07-01 DIAGNOSIS — I87.2 CHRONIC VENOUS INSUFFICIENCY: Primary | ICD-10-CM

## 2025-07-01 DIAGNOSIS — Z95.1 HX OF CABG: ICD-10-CM

## 2025-07-01 DIAGNOSIS — E78.5 HYPERLIPIDEMIA, UNSPECIFIED HYPERLIPIDEMIA TYPE: ICD-10-CM

## 2025-07-01 DIAGNOSIS — I10 HYPERTENSION, UNSPECIFIED TYPE: ICD-10-CM

## 2025-07-01 PROCEDURE — 1036F TOBACCO NON-USER: CPT | Performed by: INTERNAL MEDICINE

## 2025-07-01 PROCEDURE — 1126F AMNT PAIN NOTED NONE PRSNT: CPT | Performed by: INTERNAL MEDICINE

## 2025-07-01 PROCEDURE — 1160F RVW MEDS BY RX/DR IN RCRD: CPT | Performed by: INTERNAL MEDICINE

## 2025-07-01 PROCEDURE — 99214 OFFICE O/P EST MOD 30 MIN: CPT | Performed by: INTERNAL MEDICINE

## 2025-07-01 PROCEDURE — 1090F PRES/ABSN URINE INCON ASSESS: CPT | Performed by: INTERNAL MEDICINE

## 2025-07-01 PROCEDURE — 1159F MED LIST DOCD IN RCRD: CPT | Performed by: INTERNAL MEDICINE

## 2025-07-01 PROCEDURE — G8399 PT W/DXA RESULTS DOCUMENT: HCPCS | Performed by: INTERNAL MEDICINE

## 2025-07-01 PROCEDURE — G8427 DOCREV CUR MEDS BY ELIG CLIN: HCPCS | Performed by: INTERNAL MEDICINE

## 2025-07-01 PROCEDURE — 3075F SYST BP GE 130 - 139MM HG: CPT | Performed by: INTERNAL MEDICINE

## 2025-07-01 PROCEDURE — 3078F DIAST BP <80 MM HG: CPT | Performed by: INTERNAL MEDICINE

## 2025-07-01 PROCEDURE — G8420 CALC BMI NORM PARAMETERS: HCPCS | Performed by: INTERNAL MEDICINE

## 2025-07-01 PROCEDURE — 1123F ACP DISCUSS/DSCN MKR DOCD: CPT | Performed by: INTERNAL MEDICINE

## 2025-07-21 ENCOUNTER — TELEPHONE (OUTPATIENT)
Dept: ORTHOPEDIC SURGERY | Age: 80
End: 2025-07-21

## 2025-08-06 ENCOUNTER — OFFICE VISIT (OUTPATIENT)
Age: 80
End: 2025-08-06

## 2025-08-06 DIAGNOSIS — M65.341 TRIGGER FINGER, RIGHT RING FINGER: ICD-10-CM

## 2025-08-06 DIAGNOSIS — M65.331 TRIGGER FINGER, RIGHT MIDDLE FINGER: Primary | ICD-10-CM

## 2025-08-06 RX ORDER — BETAMETHASONE SODIUM PHOSPHATE AND BETAMETHASONE ACETATE 3; 3 MG/ML; MG/ML
6 INJECTION, SUSPENSION INTRA-ARTICULAR; INTRALESIONAL; INTRAMUSCULAR; SOFT TISSUE ONCE
Status: COMPLETED | OUTPATIENT
Start: 2025-08-06 | End: 2025-08-06

## 2025-08-06 RX ADMIN — BETAMETHASONE SODIUM PHOSPHATE AND BETAMETHASONE ACETATE 6 MG: 3; 3 INJECTION, SUSPENSION INTRA-ARTICULAR; INTRALESIONAL; INTRAMUSCULAR; SOFT TISSUE at 13:16

## 2025-08-07 ENCOUNTER — TELEPHONE (OUTPATIENT)
Dept: ORTHOPEDIC SURGERY | Age: 80
End: 2025-08-07